# Patient Record
(demographics unavailable — no encounter records)

---

## 2025-06-18 NOTE — HISTORY OF PRESENT ILLNESS
[FreeTextEntry1] : 40yo  amenorrheic(spotting only) with Mirena IUD(2019 insertion at postpartum visit) here for annual Gyn exam. Eleanor had a traumatic vaginal delivery(forceps/episiotomy).  She has occasional fecal incontinence with running but no USI. She has done pelvic PT in the past. She is interested in receiving Gardasil 9(got Gardasil original vaccine many years ago). She reports occasional expressed nipple discharge bilaterally- antonio colored, never bloody. She plans baseline mammography this year.